# Patient Record
Sex: FEMALE | Race: WHITE | URBAN - METROPOLITAN AREA
[De-identification: names, ages, dates, MRNs, and addresses within clinical notes are randomized per-mention and may not be internally consistent; named-entity substitution may affect disease eponyms.]

---

## 2022-07-19 ENCOUNTER — OFFICE VISIT (OUTPATIENT)
Dept: URBAN - METROPOLITAN AREA CLINIC 92 | Facility: CLINIC | Age: 77
End: 2022-07-19
Payer: MEDICARE

## 2022-07-19 DIAGNOSIS — H31.001 CHORIORETINAL SCAR OF RIGHT EYE: Primary | ICD-10-CM

## 2022-07-19 DIAGNOSIS — H33.322 ROUND HOLE OF RETINA OF LEFT EYE: ICD-10-CM

## 2022-07-19 DIAGNOSIS — Z96.1 PRESENCE OF INTRAOCULAR LENS: ICD-10-CM

## 2022-07-19 PROCEDURE — 92250 FUNDUS PHOTOGRAPHY W/I&R: CPT | Performed by: OPTOMETRIST

## 2022-07-19 PROCEDURE — 99204 OFFICE O/P NEW MOD 45 MIN: CPT | Performed by: OPTOMETRIST

## 2022-07-19 ASSESSMENT — VISUAL ACUITY
OD: 20/30
OS: 20/30

## 2022-07-19 ASSESSMENT — INTRAOCULAR PRESSURE
OS: 16
OD: 17

## 2022-07-19 ASSESSMENT — KERATOMETRY
OD: 41.00
OS: 41.63

## 2022-07-19 NOTE — IMPRESSION/PLAN
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 Plan: Discussed CRS in detail with patient. Will continue to observe.

## 2022-07-19 NOTE — IMPRESSION/PLAN
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 Plan: Discussed diagnosis in detail with patient. Will continue to observe.